# Patient Record
Sex: MALE | Race: WHITE | NOT HISPANIC OR LATINO | Employment: FULL TIME | URBAN - NONMETROPOLITAN AREA
[De-identification: names, ages, dates, MRNs, and addresses within clinical notes are randomized per-mention and may not be internally consistent; named-entity substitution may affect disease eponyms.]

---

## 2022-02-24 ENCOUNTER — OFFICE VISIT (OUTPATIENT)
Dept: CARDIOLOGY CLINIC | Facility: CLINIC | Age: 52
End: 2022-02-24
Payer: COMMERCIAL

## 2022-02-24 VITALS
HEIGHT: 74 IN | WEIGHT: 218.2 LBS | SYSTOLIC BLOOD PRESSURE: 154 MMHG | OXYGEN SATURATION: 98 % | BODY MASS INDEX: 28 KG/M2 | HEART RATE: 67 BPM | DIASTOLIC BLOOD PRESSURE: 98 MMHG

## 2022-02-24 DIAGNOSIS — R00.2 PALPITATION: Primary | ICD-10-CM

## 2022-02-24 DIAGNOSIS — E78.2 MIXED HYPERLIPIDEMIA: ICD-10-CM

## 2022-02-24 DIAGNOSIS — I10 PRIMARY HYPERTENSION: ICD-10-CM

## 2022-02-24 PROCEDURE — 99244 OFF/OP CNSLTJ NEW/EST MOD 40: CPT | Performed by: INTERNAL MEDICINE

## 2022-02-24 RX ORDER — MV-MIN/FOLIC/VIT K/LYCOP/COQ10 200-100MCG
20 CAPSULE ORAL DAILY
Qty: 90 CAPSULE | Refills: 3 | Status: SHIPPED | OUTPATIENT
Start: 2022-02-24 | End: 2022-02-24

## 2022-02-24 RX ORDER — LISINOPRIL 10 MG/1
2 TABLET ORAL DAILY
COMMUNITY
Start: 2021-09-29 | End: 2022-02-24 | Stop reason: SDUPTHER

## 2022-02-24 RX ORDER — MV-MIN/FOLIC/VIT K/LYCOP/COQ10 200-100MCG
CAPSULE ORAL
COMMUNITY
End: 2022-02-24 | Stop reason: SDUPTHER

## 2022-02-24 RX ORDER — LISINOPRIL 20 MG/1
20 TABLET ORAL DAILY
Qty: 90 TABLET | Refills: 3 | Status: SHIPPED | OUTPATIENT
Start: 2022-02-24

## 2022-02-24 RX ORDER — SIMVASTATIN 20 MG
1 TABLET ORAL
COMMUNITY
Start: 2022-01-06

## 2022-02-24 NOTE — PROGRESS NOTES
Carmelita Colvin  1970  [id number]  [facility]  [address] [facility] [address]  [address]  [phone]    1  Palpitation  -     Echo stress test, exercise; Future; Expected date: 03/03/2022  -     Multiple Vitamins-Minerals (Daily Multivitamin) CAPS; Take 20 capsules by mouth in the morning    2  Mixed hyperlipidemia  -     Echo stress test, exercise; Future; Expected date: 03/03/2022  -     Multiple Vitamins-Minerals (Daily Multivitamin) CAPS; Take 20 capsules by mouth in the morning    3  Primary hypertension  -     Echo stress test, exercise; Future; Expected date: 03/03/2022  -     Multiple Vitamins-Minerals (Daily Multivitamin) CAPS; Take 20 capsules by mouth in the morning         ASSESSMENT/PLAN  49-year-old gentleman history of hypertension dyslipidemia and family history of heart disease now with some exertional palpitations  Although this would be atypical for angina nonetheless given his risk factors he is agreeable to exercise stress testing to rule out ischemic or structural heart disease  Additionally the echocardiogram will make sure he does not have a pericardial effusion related to his previous COVID infection or cardiomyopathy from potential myocarditis  We reviewed that his right bundle branch block does not portend an adverse prognosis and does not require any additional evaluation  We have requested the Zio monitor report so that I can review but his couple of seconds of palpitations does not sound like it would be he is representative of significant arrhythmia that would require any further evaluation or treatment  Of finally his blood pressure is not optimally controlled  He is agreeable to doubling his lisinopril 20 mg daily as well as following a lower salt diet  Continue statin therapy and low-fat diet for dyslipidemia  His goal LDL cholesterol should be below 100      Regular cardiology follow-up recommended  Thank you for this interesting consultation      HPI   49-year-old gentleman history of hypertension dyslipidemia and family history of heart disease in his father who suffered a COVID infection a couple of years ago  He also was started on treatment for hypertension and dyslipidemia around that time  Since then he has noted a couple seconds of fluttering sensation when he walks up an incline to his workout facility  It is not associated with any chest pressure or shortness of breath or dizziness and does not typically come on at any other time  He says he goes for jogs for 1-2 miles on a regular basis and has never experienced any symptoms or fluttering during these exercise  He otherwise denies any history of heart attack or stroke or heart failure  His have the health has otherwise been good  He did slip and fall on the ice a few weeks ago and has not been as active and has gained about 10 lb  Otherwise he feels like his COVID infection a few years ago was not severe and he otherwise felt like he made a full recovery  He denies any history of ankle swelling orthopnea or PND  No calf pain or claudication  No syncope or near syncope  He does not smoke        His father suffered from ischemic heart disease in mid life  Medical Problems             Problem List     Mixed hyperlipidemia    Primary hypertension    Palpitation              Past Medical History:   Diagnosis Date    Cancer ([facility] [address] )     HTN (hypertension)     Hyperlipidemia      Social History     Socioeconomic History    Marital status: /Civil Union     Spouse name: Not on file    Number of children: Not on file    Years of education: Not on file    Highest education level: Not on file   Occupational History    Not on file   Tobacco Use    Smoking status: Former Smoker     Packs/day: 0 50     Years: 3 00     Pack years: 1 50     Types: Cigarettes    Smokeless tobacco: Never Used   Substance and Sexual Activity    Alcohol use: Yes     Alcohol/week: 28 0 standard drinks     Types: 28 Glasses of wine per week    Drug use: Yes     Types: Marijuana     Comment: rare    Sexual activity: Not on file   Other Topics Concern    Not on file   Social History Narrative    Not on file     Social Determinants of Health     Financial Resource Strain: Not on file   Food Insecurity: Not on file   Transportation Needs: Not on file   Physical Activity: Not on file   Stress: Not on file   Social Connections: Not on file   Intimate Partner Violence: Not on file   Housing Stability: Not on file      History reviewed  No pertinent family history  Past Surgical History:   Procedure Laterality Date    SKIN CANCER EXCISION         Current Outpatient Medications:     lisinopril (ZESTRIL) 10 mg tablet, Take 2 tablets by mouth daily , Disp: , Rfl:     Multiple Vitamins-Minerals (Daily Multivitamin) CAPS, Take 20 capsules by mouth in the morning, Disp: 90 capsule, Rfl: 3    simvastatin (ZOCOR) 20 mg tablet, Take 1 tablet by mouth daily at bedtime, Disp: , Rfl:   No Known Allergies    Labs:     Chemistry    No results found for: NA, K, CL, CO2, BUN, CREATININE No results found for: CALCIUM, ALKPHOS, AST, ALT, BILITOT         No results found for: CHOL  No results found for: HDL  No results found for: LDLCALC  No results found for: TRIG  No results found for: CHOLHDL    Imaging: No results found  ECG:  Sinus rhythm and right bundle branch block      ROS   he denies nausea vomiting diarrhea  No calf pain or claudication  No fevers chills or cough  10 lb weight gain as noted above  No bleeding history  No neurologic symptoms  He does have intermittent joint pains  No rashes  All other review of systems are negative other than outlined above  Vitals:    02/24/22 1055   BP: 154/98   Pulse: 67   SpO2: 98%     Vitals:    02/24/22 1055   Weight: 99 kg (218 lb 3 2 oz)     Height: 6' 1 5" (186 7 cm)   Body mass index is 28 4 kg/m²      Physical Exam:  Vital signs reviewed  General appearance:  Appears stated age, alert, well appearing and in no distress  Alert and oriented x4  HEENT:  PERRLA, EOMI, no scleral icterus, no conjunctival pallor  NECK:  Supple, No elevated JVP, no thyromegaly, no carotid bruits, no JVD  HEART:  Regular rate and rhythm, normal S1/S2, no S3/S4, no murmur or rub, PMI nondisplaced  LUNGS:  Clear to auscultation bilaterally, no wheezes rales or rhonchi  ABDOMEN:  Soft, non-tender, positive bowel sounds, no rebound or guarding, no organomegaly   EXTREMITIES:  Normal range of motion  No clubbing or cyanosis   No edema  VASCULAR:  Normal pedal pulses   SKIN: No lesions or rashes on exposed skin  NEURO:  CN II-XII intact, no focal deficits

## 2022-03-25 ENCOUNTER — HOSPITAL ENCOUNTER (OUTPATIENT)
Dept: NON INVASIVE DIAGNOSTICS | Facility: HOSPITAL | Age: 52
Discharge: HOME | End: 2022-03-25
Attending: INTERNAL MEDICINE
Payer: COMMERCIAL

## 2022-03-25 VITALS — WEIGHT: 218 LBS | HEIGHT: 73 IN | BODY MASS INDEX: 28.89 KG/M2

## 2022-03-25 DIAGNOSIS — R00.2 PALPITATION: ICD-10-CM

## 2022-03-25 DIAGNOSIS — E78.2 MIXED HYPERLIPIDEMIA: ICD-10-CM

## 2022-03-25 DIAGNOSIS — I10 PRIMARY HYPERTENSION: ICD-10-CM

## 2022-03-25 LAB
ARRHY DURING EX: NORMAL
CHEST PAIN STATEMENT: NORMAL
MAX DIASTOLIC BP: 80 MMHG
MAX HEART RATE: 131 BPM
MAX HR PERCENT: 78 %
MAX HR: 167 BPM
MAX PREDICTED HEART RATE: 167 BPM
MAX. SYSTOLIC BP: 200 MMHG
PROTOCOL NAME: NORMAL
RATE PRESSURE PRODUCT: NORMAL
SL CV STRESS RECOVERY BP: 180 MMHG
SL CV STRESS RECOVERY HR: 97 BPM
SL CV STRESS RECOVERY O2 SAT: 98 %
SL CV STRESS STAGE REACHED: 4
STRESS ANGINA INDEX: 0
STRESS BASELINE BP: NORMAL MMHG
STRESS BASELINE HR: 65 BPM
STRESS O2 SAT REST: 97 %
STRESS PEAK HR: 131 BPM
STRESS PERCENT HR: 78 %
STRESS POST ESTIMATED WORKLOAD: 13.4 METS
STRESS POST EXERCISE DUR MIN: 10 MIN
STRESS POST EXERCISE DUR SEC: 45 SEC
STRESS POST O2 SAT PEAK: 99 %
STRESS POST PEAK BP: 150 MMHG
STRESS TARGET HR: 131 BPM
TARGET HR FORMULA: NORMAL
TEST INDICATION: NORMAL
TIME IN EXERCISE PHASE: NORMAL

## 2022-03-25 PROCEDURE — 93350 STRESS TTE ONLY: CPT

## 2022-04-28 ENCOUNTER — TELEPHONE (OUTPATIENT)
Dept: CARDIOLOGY CLINIC | Facility: CLINIC | Age: 52
End: 2022-04-28

## 2022-04-28 NOTE — TELEPHONE ENCOUNTER
Received paper stating that test was denied through the insurance because of the Dx codes  Please advise  Paper put on Dr Darrell petersen

## 2022-05-02 NOTE — TELEPHONE ENCOUNTER
Yes, it has been completed  It has now been denied through the insurance and they are not paying for it due to the DX codes  They would like different DX codes  The ones that are linked to the order were E78 2- mixed hyperlipidemia, I10- Primary htn, and r00 2- palpitation

## 2022-05-02 NOTE — TELEPHONE ENCOUNTER
They answered in return to that, that if the DX codes are not being changed there is nothing they can do with the note I sent  Please advise   Pt will have to pay for the test

## 2022-05-02 NOTE — TELEPHONE ENCOUNTER
Suggest sending a copy of my full note and highlight hypertension and right bundle branch block as well

## 2022-05-02 NOTE — TELEPHONE ENCOUNTER
The stress echocardiogram was completed last month  The indication was chest pain and right bundle-branch block

## 2023-11-09 ENCOUNTER — HOSPITAL ENCOUNTER (EMERGENCY)
Facility: HOSPITAL | Age: 53
Discharge: HOME | End: 2023-11-09
Attending: STUDENT IN AN ORGANIZED HEALTH CARE EDUCATION/TRAINING PROGRAM
Payer: COMMERCIAL

## 2023-11-09 VITALS
TEMPERATURE: 97.3 F | BODY MASS INDEX: 27.19 KG/M2 | RESPIRATION RATE: 16 BRPM | HEIGHT: 74 IN | OXYGEN SATURATION: 100 % | SYSTOLIC BLOOD PRESSURE: 157 MMHG | DIASTOLIC BLOOD PRESSURE: 75 MMHG | HEART RATE: 60 BPM | WEIGHT: 211.86 LBS

## 2023-11-09 DIAGNOSIS — R55 POSTURAL DIZZINESS WITH PRESYNCOPE: Primary | ICD-10-CM

## 2023-11-09 DIAGNOSIS — R42 POSTURAL DIZZINESS WITH PRESYNCOPE: Primary | ICD-10-CM

## 2023-11-09 DIAGNOSIS — R00.2 PALPITATIONS: ICD-10-CM

## 2023-11-09 LAB
ANION GAP SERPL CALCULATED.3IONS-SCNC: 8 MMOL/L
BASOPHILS # BLD AUTO: 0.06 THOUSANDS/ÂΜL (ref 0–0.1)
BASOPHILS NFR BLD AUTO: 1 % (ref 0–1)
BUN SERPL-MCNC: 27 MG/DL (ref 5–25)
CALCIUM SERPL-MCNC: 9 MG/DL (ref 8.4–10.2)
CARDIAC TROPONIN I PNL SERPL HS: 5 NG/L (ref 8–18)
CHLORIDE SERPL-SCNC: 105 MMOL/L (ref 96–108)
CO2 SERPL-SCNC: 23 MMOL/L (ref 21–32)
CREAT SERPL-MCNC: 1.26 MG/DL (ref 0.6–1.3)
D DIMER PPP FEU-MCNC: 0.44 UG/ML FEU
EOSINOPHIL # BLD AUTO: 0.25 THOUSAND/ÂΜL (ref 0–0.61)
EOSINOPHIL NFR BLD AUTO: 3 % (ref 0–6)
ERYTHROCYTE [DISTWIDTH] IN BLOOD BY AUTOMATED COUNT: 11.7 % (ref 11.6–15.1)
GFR SERPL CREATININE-BSD FRML MDRD: 63 ML/MIN/1.73SQ M
GLUCOSE SERPL-MCNC: 97 MG/DL (ref 65–140)
HCT VFR BLD AUTO: 41.1 % (ref 36.5–49.3)
HGB BLD-MCNC: 13.7 G/DL (ref 12–17)
IMM GRANULOCYTES # BLD AUTO: 0.04 THOUSAND/UL (ref 0–0.2)
IMM GRANULOCYTES NFR BLD AUTO: 1 % (ref 0–2)
LYMPHOCYTES # BLD AUTO: 1.87 THOUSANDS/ÂΜL (ref 0.6–4.47)
LYMPHOCYTES NFR BLD AUTO: 24 % (ref 14–44)
MAGNESIUM SERPL-MCNC: 2.1 MG/DL (ref 1.9–2.7)
MCH RBC QN AUTO: 31.6 PG (ref 26.8–34.3)
MCHC RBC AUTO-ENTMCNC: 33.3 G/DL (ref 31.4–37.4)
MCV RBC AUTO: 95 FL (ref 82–98)
MONOCYTES # BLD AUTO: 0.68 THOUSAND/ÂΜL (ref 0.17–1.22)
MONOCYTES NFR BLD AUTO: 9 % (ref 4–12)
NEUTROPHILS # BLD AUTO: 4.89 THOUSANDS/ÂΜL (ref 1.85–7.62)
NEUTS SEG NFR BLD AUTO: 62 % (ref 43–75)
NRBC BLD AUTO-RTO: 0 /100 WBCS
PLATELET # BLD AUTO: 270 THOUSANDS/UL (ref 149–390)
PMV BLD AUTO: 9.6 FL (ref 8.9–12.7)
POTASSIUM SERPL-SCNC: 5.6 MMOL/L (ref 3.5–5.3)
RBC # BLD AUTO: 4.34 MILLION/UL (ref 3.88–5.62)
SODIUM SERPL-SCNC: 136 MMOL/L (ref 135–147)
WBC # BLD AUTO: 7.79 THOUSAND/UL (ref 4.31–10.16)

## 2023-11-09 PROCEDURE — 93005 ELECTROCARDIOGRAM TRACING: CPT

## 2023-11-09 PROCEDURE — 85025 COMPLETE CBC W/AUTO DIFF WBC: CPT | Performed by: STUDENT IN AN ORGANIZED HEALTH CARE EDUCATION/TRAINING PROGRAM

## 2023-11-09 PROCEDURE — 36415 COLL VENOUS BLD VENIPUNCTURE: CPT | Performed by: STUDENT IN AN ORGANIZED HEALTH CARE EDUCATION/TRAINING PROGRAM

## 2023-11-09 PROCEDURE — 99284 EMERGENCY DEPT VISIT MOD MDM: CPT

## 2023-11-09 PROCEDURE — 80048 BASIC METABOLIC PNL TOTAL CA: CPT | Performed by: STUDENT IN AN ORGANIZED HEALTH CARE EDUCATION/TRAINING PROGRAM

## 2023-11-09 PROCEDURE — 83735 ASSAY OF MAGNESIUM: CPT | Performed by: STUDENT IN AN ORGANIZED HEALTH CARE EDUCATION/TRAINING PROGRAM

## 2023-11-09 PROCEDURE — 99285 EMERGENCY DEPT VISIT HI MDM: CPT | Performed by: STUDENT IN AN ORGANIZED HEALTH CARE EDUCATION/TRAINING PROGRAM

## 2023-11-09 PROCEDURE — 84484 ASSAY OF TROPONIN QUANT: CPT | Performed by: STUDENT IN AN ORGANIZED HEALTH CARE EDUCATION/TRAINING PROGRAM

## 2023-11-09 PROCEDURE — 96360 HYDRATION IV INFUSION INIT: CPT

## 2023-11-09 PROCEDURE — 85379 FIBRIN DEGRADATION QUANT: CPT | Performed by: STUDENT IN AN ORGANIZED HEALTH CARE EDUCATION/TRAINING PROGRAM

## 2023-11-09 RX ADMIN — SODIUM CHLORIDE 1000 ML: 0.9 INJECTION, SOLUTION INTRAVENOUS at 17:22

## 2023-11-09 NOTE — DISCHARGE INSTRUCTIONS
The ECG/laboratory studies that were obtained did not show any significant abnormalities. Drink plenty of clear/hydrating fluids over the next few days. Follow-up with your primary care provider. Return to the emergency department for any concerning signs or symptoms.

## 2023-11-09 NOTE — ED PROVIDER NOTES
History  Chief Complaint   Patient presents with    Dizziness     Pt c/o intermittent dizziness over the last couple of days. Pt reports getting dizzy upon standing today and having a fluttering sensation on the right side of his chest.        History provided by:  Patient  Dizziness  Quality:  Lightheadedness  Severity:  Moderate  Onset quality:  Gradual  Timing:  Intermittent  Progression:  Worsening  Chronicity:  New  Context comment:  States that he went running this AM and felt "great". Has been having worsening positional lightheadedness throughout the day. Develope sxs when moving from sitting to standing/moving upright from bending position. Inter palpitations. Denies CP/SOB  Relieved by:  Being still  Worsened by:  Standing up and movement  Associated symptoms: palpitations    Associated symptoms: no blood in stool, no chest pain, no diarrhea, no headaches, no nausea, no shortness of breath, no syncope, no vision changes, no vomiting and no weakness      Prior to Admission Medications   Prescriptions Last Dose Informant Patient Reported? Taking?   lisinopril (ZESTRIL) 20 mg tablet   No No   Sig: Take 1 tablet (20 mg total) by mouth daily   simvastatin (ZOCOR) 20 mg tablet   Yes No   Sig: Take 1 tablet by mouth daily at bedtime      Facility-Administered Medications: None       Past Medical History:   Diagnosis Date    Cancer ([address])     HTN (hypertension)     Hyperlipidemia        Past Surgical History:   Procedure Laterality Date    SKIN CANCER EXCISION         History reviewed. No pertinent family history. I have reviewed and agree with the history as documented. E-Cigarette/Vaping     E-Cigarette/Vaping Substances     Social History     Tobacco Use    Smoking status: Former     Packs/day: 0.50     Years: 3.00     Total pack years: 1.50     Types: Cigarettes    Smokeless tobacco: Never   Substance Use Topics    Alcohol use:  Yes     Alcohol/week: 28.0 standard drinks of alcohol     Types: 28 Glasses of wine per week    Drug use: Yes     Types: Marijuana     Comment: rare       Review of Systems   Constitutional:  Negative for activity change, appetite change, fatigue and fever. HENT:  Negative for congestion, rhinorrhea, sinus pressure and sinus pain. Respiratory:  Negative for cough, chest tightness, shortness of breath and wheezing. Cardiovascular:  Positive for palpitations. Negative for chest pain and syncope. Gastrointestinal:  Negative for abdominal pain, blood in stool, diarrhea, nausea and vomiting. Genitourinary:  Negative for decreased urine volume, difficulty urinating, flank pain and urgency. Musculoskeletal:  Negative for back pain, myalgias, neck pain and neck stiffness. Skin:  Negative for color change, pallor, rash and wound. Neurological:  Positive for dizziness and light-headedness. Negative for syncope, weakness and headaches. All other systems reviewed and are negative. Physical Exam  Physical Exam  Vitals and nursing note reviewed. Constitutional:       General: He is not in acute distress. Appearance: He is not ill-appearing or toxic-appearing. HENT:      Head: Normocephalic and atraumatic. Right Ear: External ear normal.      Left Ear: External ear normal.      Nose: No congestion or rhinorrhea. Eyes:      General:         Right eye: No discharge. Left eye: No discharge. Extraocular Movements: Extraocular movements intact. Conjunctiva/sclera: Conjunctivae normal.   Cardiovascular:      Rate and Rhythm: Normal rate and regular rhythm. Pulses: Normal pulses. Heart sounds: Normal heart sounds. No murmur heard. Pulmonary:      Effort: Pulmonary effort is normal. No respiratory distress. Breath sounds: Normal breath sounds. No stridor. No wheezing, rhonchi or rales. Chest:      Chest wall: No tenderness. Abdominal:      General: Abdomen is flat. Bowel sounds are normal. There is no distension.       Palpations: Abdomen is soft. There is no mass. Tenderness: There is no abdominal tenderness. There is no right CVA tenderness, left CVA tenderness, guarding or rebound. Hernia: No hernia is present. Musculoskeletal:         General: No swelling, tenderness, deformity or signs of injury. Cervical back: Neck supple. No tenderness. Right lower leg: No edema. Left lower leg: No edema. Skin:     General: Skin is warm and dry. Capillary Refill: Capillary refill takes less than 2 seconds. Coloration: Skin is not jaundiced or pale. Findings: No bruising, erythema or rash. Neurological:      General: No focal deficit present. Mental Status: He is alert and oriented to person, place, and time. Cranial Nerves: No cranial nerve deficit. Sensory: No sensory deficit. Motor: No weakness. Psychiatric:         Mood and Affect: Mood normal.         Behavior: Behavior normal.         Thought Content:  Thought content normal.         Judgment: Judgment normal.         Vital Signs  ED Triage Vitals   Temperature Pulse Respirations Blood Pressure SpO2   11/09/23 1652 11/09/23 1652 11/09/23 1652 11/09/23 1654 11/09/23 1652   (!) 97.3 °F (36.3 °C) 64 16 156/71 99 %      Temp Source Heart Rate Source Patient Position - Orthostatic VS BP Location FiO2 (%)   11/09/23 1652 11/09/23 1652 11/09/23 1652 11/09/23 1652 --   Temporal Monitor Sitting Right arm       Pain Score       --                  Vitals:    11/09/23 1652 11/09/23 1654 11/09/23 1915   BP:  156/71 157/75   Pulse: 64  60   Patient Position - Orthostatic VS: Sitting           Visual Acuity      ED Medications  Medications   sodium chloride 0.9 % bolus 1,000 mL (0 mL Intravenous Stopped 11/9/23 1822)       Diagnostic Studies  Results Reviewed       Procedure Component Value Units Date/Time    High Sensitivity Troponin I Random [[id number]]  (Abnormal) Collected: 11/09/23 1712    Lab Status: Final result Specimen: Blood from Arm, Left Updated: 11/09/23 1748     HS TnI random 5 ng/L     Basic metabolic panel [[id number]]  (Abnormal) Collected: 11/09/23 1712    Lab Status: Final result Specimen: Blood from Arm, Left Updated: 11/09/23 1744     Sodium 136 mmol/L      Potassium 5.6 mmol/L      Chloride 105 mmol/L      CO2 23 mmol/L      ANION GAP 8 mmol/L      BUN 27 mg/dL      Creatinine 1.26 mg/dL      Glucose 97 mg/dL      Calcium 9.0 mg/dL      eGFR 63 ml/min/1.73sq m     Narrative:      [place] guidelines for Chronic Kidney Disease (CKD):     Stage 1 with normal or high GFR (GFR > 90 mL/min/1.73 square meters)    Stage 2 Mild CKD (GFR = 60-89 mL/min/1.73 square meters)    Stage 3A Moderate CKD (GFR = 45-59 mL/min/1.73 square meters)    Stage 3B Moderate CKD (GFR = 30-44 mL/min/1.73 square meters)    Stage 4 Severe CKD (GFR = 15-29 mL/min/1.73 square meters)    Stage 5 End Stage CKD (GFR <15 mL/min/1.73 square meters)  Note: GFR calculation is accurate only with a steady state creatinine    Magnesium [[id number]]  (Normal) Collected: 11/09/23 1712    Lab Status: Final result Specimen: Blood from Arm, Left Updated: 11/09/23 1744     Magnesium 2.1 mg/dL     D-dimer, quantitative [[id number]]  (Normal) Collected: 11/09/23 1712    Lab Status: Final result Specimen: Blood from Arm, Left Updated: 11/09/23 1731     D-Dimer, Quant 0.44 ug/ml FEU     Narrative: In the evaluation for possible pulmonary embolism, in the appropriate (Well's Score of 4 or less) patient, the age adjusted d-dimer cutoff for this patient can be calculated as:    Age x 0.01 (in ug/mL) for Age-adjusted D-dimer exclusion threshold for a patient over 50 years.     CBC and differential [[id number]] Collected: 11/09/23 1712    Lab Status: Final result Specimen: Blood from Arm, Left Updated: 11/09/23 1717     WBC 7.79 Thousand/uL      RBC 4.34 Million/uL      Hemoglobin 13.7 g/dL      Hematocrit 41.1 %      MCV 95 fL      MCH 31.6 pg      MCHC 33.3 g/dL RDW 11.7 %      MPV 9.6 fL      Platelets 372 Thousands/uL      nRBC 0 /100 WBCs      Neutrophils Relative 62 %      Immat GRANS % 1 %      Lymphocytes Relative 24 %      Monocytes Relative 9 %      Eosinophils Relative 3 %      Basophils Relative 1 %      Neutrophils Absolute 4.89 Thousands/µL      Immature Grans Absolute 0.04 Thousand/uL      Lymphocytes Absolute 1.87 Thousands/µL      Monocytes Absolute 0.68 Thousand/µL      Eosinophils Absolute 0.25 Thousand/µL      Basophils Absolute 0.06 Thousands/µL                    No orders to display              Procedures  ECG 12 Lead Documentation Only    Date/Time: 11/9/2023 4:53 PM    Performed by: Daved Lanes, DO  Authorized by: Daved Lanes, DO    Indications / Diagnosis:  Lightheadedness, palpitations  ECG reviewed by me, the ED Provider: yes    Patient location:  ED  Previous ECG:     Previous ECG:  Unavailable  Interpretation:     Interpretation: normal    Rate:     ECG rate:  61    ECG rate assessment: normal    Rhythm:     Rhythm: sinus rhythm    Ectopy:     Ectopy: none    QRS:     QRS axis:  Normal    QRS intervals:  Normal  ST segments:     ST segments:  Normal  T waves:     T waves: normal             ED Course  ED Course as of 11/09/23 2146   Thu Nov 09, 2023   1755 BMP is within normal limits. No leukocytosis. Hemoglobin is within normal limits. Normal platelet count. D-dimer negative. Troponin negative. ECG showed normal sinus rhythm. Heart rate 61 bpm.  Right bundle branch block which is chronic. Normal axis. No acute ischemic changes. 1849 Upon reevaluation, the patient states that he is able to ambulate without presyncope. Able to tolerate PO. Medical Decision Making  The differential diagnoses include but are not limited to orthostatic hypotension, dehydration, acute blood loss anemia, vasovagal presyncope, ACS, PE  Vital signs reviewed. ECG without acute findings.  Denies chest pain/sob. Troponin/d dimer negative. Low suspicion for ACS/PE. IVF administered. Symptoms improved. Able to ambulate on his own power without recurrence of lightheadedness. Recommendations/return precautions were discussed with the patient. All questions addressed. Stable for discharge. Problems Addressed:  Palpitations: acute illness or injury  Postural dizziness with presyncope: acute illness or injury    Amount and/or Complexity of Data Reviewed  Labs: ordered. Decision-making details documented in ED Course. ECG/medicine tests: ordered and independent interpretation performed. Decision-making details documented in ED Course. Disposition  Final diagnoses:   Postural dizziness with presyncope   Palpitations     Time reflects when diagnosis was documented in both MDM as applicable and the Disposition within this note       Time User Action Codes Description Comment    11/9/2023  6:54 PM Thurmon Guardian Add [R42,  R55] Postural dizziness with presyncope     11/9/2023  6:54 PM Thurmon Guardian Add [R00.2] Palpitations           ED Disposition       ED Disposition   Discharge    Condition   Stable    Date/Time   Thu Nov 9, 2023  6:54 PM    Comment   Ann Marie Gomez discharge to home/self care. Follow-up Information    None         Discharge Medication List as of 11/9/2023  6:55 PM        CONTINUE these medications which have NOT CHANGED    Details   lisinopril (ZESTRIL) 20 mg tablet Take 1 tablet (20 mg total) by mouth daily, Starting Thu 2/24/2022, Normal      simvastatin (ZOCOR) 20 mg tablet Take 1 tablet by mouth daily at bedtime, Starting Thu 1/6/2022, Historical Med             No discharge procedures on file.     PDMP Review       None            ED Provider  Electronically Signed by             Eugenia Alcantar DO  11/09/23 2088

## 2023-11-09 NOTE — ED NOTES
Ambulated Pt per provider request. Pt stated his head felt foggy while walking, provider was notified.       Indu Sheldon  11/09/23 3157

## 2023-11-11 LAB
ATRIAL RATE: 61 BPM
P AXIS: 47 DEGREES
PR INTERVAL: 198 MS
QRS AXIS: 56 DEGREES
QRSD INTERVAL: 168 MS
QT INTERVAL: 444 MS
QTC INTERVAL: 446 MS
T WAVE AXIS: 28 DEGREES
VENTRICULAR RATE: 61 BPM

## 2023-11-11 PROCEDURE — 93010 ELECTROCARDIOGRAM REPORT: CPT | Performed by: STUDENT IN AN ORGANIZED HEALTH CARE EDUCATION/TRAINING PROGRAM
